# Patient Record
Sex: FEMALE | Race: WHITE | NOT HISPANIC OR LATINO | Employment: UNEMPLOYED | URBAN - METROPOLITAN AREA
[De-identification: names, ages, dates, MRNs, and addresses within clinical notes are randomized per-mention and may not be internally consistent; named-entity substitution may affect disease eponyms.]

---

## 2023-05-10 ENCOUNTER — HOSPITAL ENCOUNTER (INPATIENT)
Facility: HOSPITAL | Age: 28
LOS: 2 days | Discharge: HOME | DRG: 776 | End: 2023-05-12
Attending: OBSTETRICS & GYNECOLOGY | Admitting: OBSTETRICS & GYNECOLOGY
Payer: MEDICAID

## 2023-05-10 DIAGNOSIS — Z34.90 PREGNANCY: ICD-10-CM

## 2023-05-10 PROBLEM — O09.33 NO PRENATAL CARE IN CURRENT PREGNANCY IN THIRD TRIMESTER: Status: ACTIVE | Noted: 2023-05-10

## 2023-05-10 LAB
ABO + RH BLD: NORMAL
ALBUMIN SERPL BCP-MCNC: 3.1 G/DL (ref 3.5–5.2)
ALP SERPL-CCNC: 186 U/L (ref 55–135)
ALT SERPL W/O P-5'-P-CCNC: 28 U/L (ref 10–44)
ANION GAP SERPL CALC-SCNC: 12 MMOL/L (ref 8–16)
AST SERPL-CCNC: 30 U/L (ref 10–40)
BASOPHILS # BLD AUTO: 0.05 K/UL (ref 0–0.2)
BASOPHILS NFR BLD: 0.2 % (ref 0–1.9)
BILIRUB SERPL-MCNC: 0.5 MG/DL (ref 0.1–1)
BLD GP AB SCN CELLS X3 SERPL QL: NORMAL
BUN SERPL-MCNC: 7 MG/DL (ref 6–20)
CALCIUM SERPL-MCNC: 8.9 MG/DL (ref 8.7–10.5)
CHLORIDE SERPL-SCNC: 106 MMOL/L (ref 95–110)
CO2 SERPL-SCNC: 18 MMOL/L (ref 23–29)
CREAT SERPL-MCNC: 0.7 MG/DL (ref 0.5–1.4)
CREAT UR-MCNC: 184.3 MG/DL (ref 15–325)
DIFFERENTIAL METHOD: ABNORMAL
EOSINOPHIL # BLD AUTO: 0 K/UL (ref 0–0.5)
EOSINOPHIL NFR BLD: 0 % (ref 0–8)
ERYTHROCYTE [DISTWIDTH] IN BLOOD BY AUTOMATED COUNT: 15.2 % (ref 11.5–14.5)
EST. GFR  (NO RACE VARIABLE): >60 ML/MIN/1.73 M^2
GLUCOSE SERPL-MCNC: 103 MG/DL (ref 70–110)
HCT VFR BLD AUTO: 36.3 % (ref 37–48.5)
HGB BLD-MCNC: 11.6 G/DL (ref 12–16)
HIV1+2 IGG SERPL QL IA.RAPID: NORMAL
IMM GRANULOCYTES # BLD AUTO: 0.14 K/UL (ref 0–0.04)
IMM GRANULOCYTES NFR BLD AUTO: 0.5 % (ref 0–0.5)
LYMPHOCYTES # BLD AUTO: 0.9 K/UL (ref 1–4.8)
LYMPHOCYTES NFR BLD: 3.5 % (ref 18–48)
MCH RBC QN AUTO: 24.6 PG (ref 27–31)
MCHC RBC AUTO-ENTMCNC: 32 G/DL (ref 32–36)
MCV RBC AUTO: 77 FL (ref 82–98)
MONOCYTES # BLD AUTO: 1 K/UL (ref 0.3–1)
MONOCYTES NFR BLD: 4 % (ref 4–15)
NEUTROPHILS # BLD AUTO: 23.7 K/UL (ref 1.8–7.7)
NEUTROPHILS NFR BLD: 91.8 % (ref 38–73)
NRBC BLD-RTO: 0 /100 WBC
PLATELET # BLD AUTO: 404 K/UL (ref 150–450)
PMV BLD AUTO: 11.5 FL (ref 9.2–12.9)
POTASSIUM SERPL-SCNC: 4.8 MMOL/L (ref 3.5–5.1)
PROT SERPL-MCNC: 7 G/DL (ref 6–8.4)
PROT UR-MCNC: 29 MG/DL
PROT/CREAT UR: 0.16 MG/G{CREAT} (ref 0–0.2)
RBC # BLD AUTO: 4.71 M/UL (ref 4–5.4)
SODIUM SERPL-SCNC: 136 MMOL/L (ref 136–145)
WBC # BLD AUTO: 25.87 K/UL (ref 3.9–12.7)

## 2023-05-10 PROCEDURE — 86592 SYPHILIS TEST NON-TREP QUAL: CPT | Performed by: OBSTETRICS & GYNECOLOGY

## 2023-05-10 PROCEDURE — 86703 HIV-1/HIV-2 1 RESULT ANTBDY: CPT | Performed by: OBSTETRICS & GYNECOLOGY

## 2023-05-10 PROCEDURE — 25000003 PHARM REV CODE 250: Performed by: OBSTETRICS & GYNECOLOGY

## 2023-05-10 PROCEDURE — 88307 PR  SURG PATH,LEVEL V: ICD-10-PCS | Mod: 26,,, | Performed by: PATHOLOGY

## 2023-05-10 PROCEDURE — 87389 HIV-1 AG W/HIV-1&-2 AB AG IA: CPT | Performed by: OBSTETRICS & GYNECOLOGY

## 2023-05-10 PROCEDURE — 36415 COLL VENOUS BLD VENIPUNCTURE: CPT | Performed by: OBSTETRICS & GYNECOLOGY

## 2023-05-10 PROCEDURE — 63600175 PHARM REV CODE 636 W HCPCS: Performed by: OBSTETRICS & GYNECOLOGY

## 2023-05-10 PROCEDURE — 88307 TISSUE EXAM BY PATHOLOGIST: CPT | Performed by: PATHOLOGY

## 2023-05-10 PROCEDURE — 86900 BLOOD TYPING SEROLOGIC ABO: CPT | Performed by: OBSTETRICS & GYNECOLOGY

## 2023-05-10 PROCEDURE — 85025 COMPLETE CBC W/AUTO DIFF WBC: CPT | Performed by: OBSTETRICS & GYNECOLOGY

## 2023-05-10 PROCEDURE — 11000001 HC ACUTE MED/SURG PRIVATE ROOM

## 2023-05-10 PROCEDURE — 84156 ASSAY OF PROTEIN URINE: CPT | Performed by: OBSTETRICS & GYNECOLOGY

## 2023-05-10 PROCEDURE — 80074 ACUTE HEPATITIS PANEL: CPT | Performed by: OBSTETRICS & GYNECOLOGY

## 2023-05-10 PROCEDURE — 86762 RUBELLA ANTIBODY: CPT | Performed by: OBSTETRICS & GYNECOLOGY

## 2023-05-10 PROCEDURE — 80053 COMPREHEN METABOLIC PANEL: CPT | Performed by: OBSTETRICS & GYNECOLOGY

## 2023-05-10 PROCEDURE — 88307 TISSUE EXAM BY PATHOLOGIST: CPT | Mod: 26,,, | Performed by: PATHOLOGY

## 2023-05-10 RX ORDER — MISOPROSTOL 200 UG/1
800 TABLET ORAL ONCE AS NEEDED
Status: DISCONTINUED | OUTPATIENT
Start: 2023-05-10 | End: 2023-05-12 | Stop reason: HOSPADM

## 2023-05-10 RX ORDER — DOCUSATE SODIUM 100 MG/1
200 CAPSULE, LIQUID FILLED ORAL 2 TIMES DAILY PRN
Status: DISCONTINUED | OUTPATIENT
Start: 2023-05-10 | End: 2023-05-12 | Stop reason: HOSPADM

## 2023-05-10 RX ORDER — HYDRALAZINE HYDROCHLORIDE 25 MG/1
50 TABLET, FILM COATED ORAL EVERY 8 HOURS
Status: COMPLETED | OUTPATIENT
Start: 2023-05-10 | End: 2023-05-10

## 2023-05-10 RX ORDER — HYDRALAZINE HYDROCHLORIDE 20 MG/ML
10 INJECTION INTRAMUSCULAR; INTRAVENOUS ONCE AS NEEDED
Status: DISCONTINUED | OUTPATIENT
Start: 2023-05-10 | End: 2023-05-12 | Stop reason: HOSPADM

## 2023-05-10 RX ORDER — DIPHENHYDRAMINE HCL 25 MG
25 CAPSULE ORAL EVERY 4 HOURS PRN
Status: DISCONTINUED | OUTPATIENT
Start: 2023-05-10 | End: 2023-05-12 | Stop reason: HOSPADM

## 2023-05-10 RX ORDER — CARBOPROST TROMETHAMINE 250 UG/ML
250 INJECTION, SOLUTION INTRAMUSCULAR
Status: DISCONTINUED | OUTPATIENT
Start: 2023-05-10 | End: 2023-05-12 | Stop reason: HOSPADM

## 2023-05-10 RX ORDER — OXYCODONE AND ACETAMINOPHEN 5; 325 MG/1; MG/1
1 TABLET ORAL EVERY 4 HOURS PRN
Status: DISCONTINUED | OUTPATIENT
Start: 2023-05-10 | End: 2023-05-12 | Stop reason: HOSPADM

## 2023-05-10 RX ORDER — HYDROCORTISONE 25 MG/G
CREAM TOPICAL 3 TIMES DAILY PRN
Status: DISCONTINUED | OUTPATIENT
Start: 2023-05-10 | End: 2023-05-12 | Stop reason: HOSPADM

## 2023-05-10 RX ORDER — MISOPROSTOL 200 UG/1
TABLET ORAL
Status: DISPENSED
Start: 2023-05-10 | End: 2023-05-11

## 2023-05-10 RX ORDER — OXYTOCIN/RINGER'S LACTATE 30/500 ML
95 PLASTIC BAG, INJECTION (ML) INTRAVENOUS ONCE AS NEEDED
Status: DISCONTINUED | OUTPATIENT
Start: 2023-05-10 | End: 2023-05-12 | Stop reason: HOSPADM

## 2023-05-10 RX ORDER — OXYTOCIN 10 [USP'U]/ML
10 INJECTION, SOLUTION INTRAMUSCULAR; INTRAVENOUS ONCE AS NEEDED
Status: DISCONTINUED | OUTPATIENT
Start: 2023-05-10 | End: 2023-05-12 | Stop reason: HOSPADM

## 2023-05-10 RX ORDER — LABETALOL HCL 20 MG/4 ML
40 SYRINGE (ML) INTRAVENOUS ONCE AS NEEDED
Status: DISCONTINUED | OUTPATIENT
Start: 2023-05-10 | End: 2023-05-12 | Stop reason: HOSPADM

## 2023-05-10 RX ORDER — PROCHLORPERAZINE EDISYLATE 5 MG/ML
5 INJECTION INTRAMUSCULAR; INTRAVENOUS EVERY 6 HOURS PRN
Status: DISCONTINUED | OUTPATIENT
Start: 2023-05-10 | End: 2023-05-12 | Stop reason: HOSPADM

## 2023-05-10 RX ORDER — MISOPROSTOL 200 UG/1
800 TABLET ORAL ONCE
Status: DISCONTINUED | OUTPATIENT
Start: 2023-05-10 | End: 2023-05-10

## 2023-05-10 RX ORDER — OXYCODONE AND ACETAMINOPHEN 10; 325 MG/1; MG/1
1 TABLET ORAL EVERY 4 HOURS PRN
Status: DISCONTINUED | OUTPATIENT
Start: 2023-05-10 | End: 2023-05-12 | Stop reason: HOSPADM

## 2023-05-10 RX ORDER — SODIUM CHLORIDE, SODIUM LACTATE, POTASSIUM CHLORIDE, CALCIUM CHLORIDE 600; 310; 30; 20 MG/100ML; MG/100ML; MG/100ML; MG/100ML
INJECTION, SOLUTION INTRAVENOUS CONTINUOUS
Status: DISCONTINUED | OUTPATIENT
Start: 2023-05-10 | End: 2023-05-10

## 2023-05-10 RX ORDER — LABETALOL 100 MG/1
100 TABLET, FILM COATED ORAL 2 TIMES DAILY
Status: DISCONTINUED | OUTPATIENT
Start: 2023-05-10 | End: 2023-05-12 | Stop reason: HOSPADM

## 2023-05-10 RX ORDER — METHYLERGONOVINE MALEATE 0.2 MG/ML
200 INJECTION INTRAVENOUS
Status: DISCONTINUED | OUTPATIENT
Start: 2023-05-10 | End: 2023-05-12 | Stop reason: HOSPADM

## 2023-05-10 RX ORDER — SIMETHICONE 80 MG
1 TABLET,CHEWABLE ORAL EVERY 6 HOURS PRN
Status: DISCONTINUED | OUTPATIENT
Start: 2023-05-10 | End: 2023-05-12 | Stop reason: HOSPADM

## 2023-05-10 RX ORDER — OXYTOCIN/RINGER'S LACTATE 30/500 ML
95 PLASTIC BAG, INJECTION (ML) INTRAVENOUS ONCE
Status: DISCONTINUED | OUTPATIENT
Start: 2023-05-10 | End: 2023-05-12 | Stop reason: HOSPADM

## 2023-05-10 RX ORDER — SODIUM CHLORIDE 0.9 % (FLUSH) 0.9 %
10 SYRINGE (ML) INJECTION
Status: DISCONTINUED | OUTPATIENT
Start: 2023-05-10 | End: 2023-05-12 | Stop reason: HOSPADM

## 2023-05-10 RX ORDER — TRANEXAMIC ACID 10 MG/ML
1000 INJECTION, SOLUTION INTRAVENOUS ONCE AS NEEDED
Status: DISCONTINUED | OUTPATIENT
Start: 2023-05-10 | End: 2023-05-12 | Stop reason: HOSPADM

## 2023-05-10 RX ORDER — DIPHENHYDRAMINE HYDROCHLORIDE 50 MG/ML
25 INJECTION INTRAMUSCULAR; INTRAVENOUS EVERY 4 HOURS PRN
Status: DISCONTINUED | OUTPATIENT
Start: 2023-05-10 | End: 2023-05-12 | Stop reason: HOSPADM

## 2023-05-10 RX ORDER — IBUPROFEN 600 MG/1
600 TABLET ORAL EVERY 6 HOURS PRN
Status: DISCONTINUED | OUTPATIENT
Start: 2023-05-10 | End: 2023-05-12 | Stop reason: HOSPADM

## 2023-05-10 RX ORDER — ONDANSETRON 8 MG/1
8 TABLET, ORALLY DISINTEGRATING ORAL EVERY 8 HOURS PRN
Status: DISCONTINUED | OUTPATIENT
Start: 2023-05-10 | End: 2023-05-12 | Stop reason: HOSPADM

## 2023-05-10 RX ORDER — LABETALOL HCL 20 MG/4 ML
80 SYRINGE (ML) INTRAVENOUS ONCE AS NEEDED
Status: DISCONTINUED | OUTPATIENT
Start: 2023-05-10 | End: 2023-05-12 | Stop reason: HOSPADM

## 2023-05-10 RX ORDER — LIDOCAINE HYDROCHLORIDE 10 MG/ML
INJECTION INFILTRATION; PERINEURAL
Status: DISCONTINUED
Start: 2023-05-10 | End: 2023-05-10 | Stop reason: WASHOUT

## 2023-05-10 RX ORDER — OXYTOCIN/RINGER'S LACTATE 30/500 ML
334 PLASTIC BAG, INJECTION (ML) INTRAVENOUS ONCE AS NEEDED
Status: DISCONTINUED | OUTPATIENT
Start: 2023-05-10 | End: 2023-05-12 | Stop reason: HOSPADM

## 2023-05-10 RX ORDER — LABETALOL HCL 20 MG/4 ML
20 SYRINGE (ML) INTRAVENOUS ONCE AS NEEDED
Status: DISCONTINUED | OUTPATIENT
Start: 2023-05-10 | End: 2023-05-12 | Stop reason: HOSPADM

## 2023-05-10 RX ADMIN — SODIUM CHLORIDE, POTASSIUM CHLORIDE, SODIUM LACTATE AND CALCIUM CHLORIDE: 600; 310; 30; 20 INJECTION, SOLUTION INTRAVENOUS at 04:05

## 2023-05-10 RX ADMIN — LABETALOL HYDROCHLORIDE 100 MG: 100 TABLET, FILM COATED ORAL at 07:05

## 2023-05-10 RX ADMIN — HYDRALAZINE HYDROCHLORIDE 50 MG: 25 TABLET, FILM COATED ORAL at 04:05

## 2023-05-10 RX ADMIN — IBUPROFEN 600 MG: 600 TABLET ORAL at 07:05

## 2023-05-10 NOTE — NURSING
Dr. Jordan informed of pts bp. Unable to start iv due to difficulty stick.   Hydralazine po ordered

## 2023-05-10 NOTE — NURSING
Received to unit per ems stretcher for  at home.  Baby in arms. Placenta delivered.  NICU team at bedside. Dr. Gant at bedside. Assessed per Dr. Gant. No vaginal laceration noted.  FF 1 below.  Small bleeding.  Unable to obtain iv access at this time.  Cytotec 800 adm per Dr. Gant's order.

## 2023-05-10 NOTE — H&P
[facility]    Obstetrics & Gynecology  History & Physical      Patient Name:  Deborah Akbar  MRN:  [id number]  Admission Date:  5/10/2023  Hospital Length of Stay:  0  Attending Physician:  Dontae Gant MD    Date:  05/10/2023     Chief Complaint:  Delivery at home    History of Present Illness:      Deborah Akbar is a 28 y.o.  admitted to L&D for post-partum care.  Pt is s/p home delivery of term appearing male baby with assistance of EMS.  Upon arrival to hospital, pt was in stable condition.  I examined pt at bedside, no vaginal laceration noted, placenta appears grossly intact, fundus was firm with light lochia.  Pt states she lives in [place] and is here visiting from out of town.  Pt states she was unaware she was pregnant with no prenatal care.  Pt is new to me while on call.    Past Medical History:      No past medical history on file.      Past Surgical History:     Tonsils and adenoids removal       Medications:     None    Allergies:      NKDA      Obstetric History:      G1, current    Gynecologic History:      Denies active STI  Denies abnormal Pap     Social History:      Denies tobacco, alcohol or illicit drug use  Denies domestic abuse     Family History:       Noncontributory    Review of Systems   Constitutional:  Positive for malaise/fatigue.   HENT: Negative.     Eyes: Negative.    Respiratory: Negative.     Cardiovascular: Negative.    Gastrointestinal:  Positive for abdominal pain.   Genitourinary: Negative.    Musculoskeletal:  Positive for back pain.   Skin: Negative.    Neurological: Negative.    Endo/Heme/Allergies: Negative.    Psychiatric/Behavioral: Negative.       Vitals:     Temp:  [98.1 °F (36.7 °C)] 98.1 °F (36.7 °C)  Pulse:  [] 94  Resp:  [20] 20  BP: (124-169)/() 133/63    GENERAL:  No acute distress.  Alert and oriented to person, place and time.  HEENT:  Normocephalic, atraumatic, anicteric, EOMI, moist mucus membranes.  Neck supple without  masses.  LUNGS:  Clear to auscultation bilaterally.  HEART:  Regular rate & rhythm with physiologic heart sounds.  ABDOMEN:  Soft, non tender without any guarding, rigidity or rebound.   PELVIC:  Normal female external genitalia without gross lesions, rashes or excoriations. No gross vaginal or cervical lesions.  Light lochia.  Fundus firm. No obvious vaginal/perineal laceration.    EXTREMITIES:  Symmetric without cramping, claudication. Trace edema LE. +2 distal pulses.  Full range of motion.  SKIN:  No rashes, good turgor & capillary refill.  NEUROLOGIC:  Grossly intact bilaterally.   PSYCH:  Mood & affect appropriate.       Laboratory:     Recent Labs   Lab 05/10/23  1458   WBC 25.87*   RBC 4.71   HGB 11.6*   HCT 36.3*      MCV 77*   MCH 24.6*   MCHC 32.0     ABO:  O POS    Assessment:     28 y.o.  with term appearing fetus s/p home vaginal delivery  No prenatal care    Plan:    Admit to L&D for post-partum care  Send off for placental gross examination  Consult social service      Dontae Gant MD

## 2023-05-11 LAB
BASOPHILS # BLD AUTO: 0.06 K/UL (ref 0–0.2)
BASOPHILS NFR BLD: 0.4 % (ref 0–1.9)
DIFFERENTIAL METHOD: ABNORMAL
EOSINOPHIL # BLD AUTO: 0.2 K/UL (ref 0–0.5)
EOSINOPHIL NFR BLD: 1 % (ref 0–8)
ERYTHROCYTE [DISTWIDTH] IN BLOOD BY AUTOMATED COUNT: 15.6 % (ref 11.5–14.5)
HAV IGM SERPL QL IA: ABNORMAL
HBV CORE IGM SERPL QL IA: ABNORMAL
HBV SURFACE AG SERPL QL IA: ABNORMAL
HCT VFR BLD AUTO: 29.5 % (ref 37–48.5)
HCV AB SERPL QL IA: REACTIVE
HGB BLD-MCNC: 9.4 G/DL (ref 12–16)
HIV 1+2 AB+HIV1 P24 AG SERPL QL IA: NORMAL
IMM GRANULOCYTES # BLD AUTO: 0.11 K/UL (ref 0–0.04)
IMM GRANULOCYTES NFR BLD AUTO: 0.7 % (ref 0–0.5)
LYMPHOCYTES # BLD AUTO: 3.1 K/UL (ref 1–4.8)
LYMPHOCYTES NFR BLD: 19.1 % (ref 18–48)
MCH RBC QN AUTO: 24.5 PG (ref 27–31)
MCHC RBC AUTO-ENTMCNC: 31.9 G/DL (ref 32–36)
MCV RBC AUTO: 77 FL (ref 82–98)
MONOCYTES # BLD AUTO: 1.3 K/UL (ref 0.3–1)
MONOCYTES NFR BLD: 8.3 % (ref 4–15)
NEUTROPHILS # BLD AUTO: 11.4 K/UL (ref 1.8–7.7)
NEUTROPHILS NFR BLD: 70.5 % (ref 38–73)
NRBC BLD-RTO: 0 /100 WBC
PLATELET # BLD AUTO: 313 K/UL (ref 150–450)
PMV BLD AUTO: 11.6 FL (ref 9.2–12.9)
RBC # BLD AUTO: 3.84 M/UL (ref 4–5.4)
RUBV IGG SER-ACNC: 34.5 IU/ML
RUBV IGG SER-IMP: REACTIVE
WBC # BLD AUTO: 16.15 K/UL (ref 3.9–12.7)

## 2023-05-11 PROCEDURE — 99231 SBSQ HOSP IP/OBS SF/LOW 25: CPT | Mod: ,,, | Performed by: OBSTETRICS & GYNECOLOGY

## 2023-05-11 PROCEDURE — 11000001 HC ACUTE MED/SURG PRIVATE ROOM

## 2023-05-11 PROCEDURE — 36415 COLL VENOUS BLD VENIPUNCTURE: CPT | Performed by: OBSTETRICS & GYNECOLOGY

## 2023-05-11 PROCEDURE — 85025 COMPLETE CBC W/AUTO DIFF WBC: CPT | Performed by: OBSTETRICS & GYNECOLOGY

## 2023-05-11 PROCEDURE — 25000003 PHARM REV CODE 250: Performed by: OBSTETRICS & GYNECOLOGY

## 2023-05-11 PROCEDURE — 99231 PR SUBSEQUENT HOSPITAL CARE,LEVL I: ICD-10-PCS | Mod: ,,, | Performed by: OBSTETRICS & GYNECOLOGY

## 2023-05-11 RX ADMIN — OXYCODONE AND ACETAMINOPHEN 1 TABLET: 5; 325 TABLET ORAL at 01:05

## 2023-05-11 RX ADMIN — LABETALOL HYDROCHLORIDE 100 MG: 100 TABLET, FILM COATED ORAL at 09:05

## 2023-05-11 RX ADMIN — OXYCODONE HYDROCHLORIDE AND ACETAMINOPHEN 1 TABLET: 10; 325 TABLET ORAL at 07:05

## 2023-05-11 RX ADMIN — SIMETHICONE 80 MG: 80 TABLET, CHEWABLE ORAL at 09:05

## 2023-05-11 RX ADMIN — DIPHENHYDRAMINE HYDROCHLORIDE 25 MG: 25 CAPSULE ORAL at 11:05

## 2023-05-11 NOTE — LACTATION NOTE
Discussed use of electric breastpump and importance of EBM for infant in NICU. Mother declines use of pump and does not desire to breastfeed.

## 2023-05-11 NOTE — NURSING
Pt ambulated to bathroom w/ RN assistance. Pt voided 300cc, pericare provided, pad and underwear provided, clean gown change.

## 2023-05-11 NOTE — NURSING
Dr. Gant called. POC reviewed w/ MD. MD order for PC ratio, labetalol 100mg BID starting now, keep pt for 2hr to monitor BP, labetalol HTN protocol for severe range pressures. Orders received.

## 2023-05-11 NOTE — PROGRESS NOTES
"[facility]    Obstetrics & Gynecology  Progress Note      Patient Name:  Deborah Akbar  MRN:  [id number]  Admission Date:  5/10/2023  Hospital Length of Stay:  1  Attending Physician:  Dontae Gant MD    Subjective:     Date:  2023    Principal Problem:   (spontaneous vaginal delivery)    Hospital Course:  Post Partum Day # 1 - s/p vaginal delivery at 40w0d    Patient without major complaints  No acute events overnight  Denies dizziness, SOB, DENIS, or CP  Pain well controlled  Lochia less than menses  Bottle/breast feeding   Breast non-tender, non-engorged  Ambulating, tolerating regular diet, and voiding without diffculty  Bonding well with baby    Objective:     Temp:  [98.1 °F (36.7 °C)-99.3 °F (37.4 °C)] 98.2 °F (36.8 °C)  Pulse:  [] 76  Resp:  [17-20] 17  SpO2:  [97 %] 97 %  BP: (111-169)/() 137/92    BP (!) 137/92 (BP Location: Right arm, Patient Position: Lying)   Pulse 76   Temp 98.2 °F (36.8 °C) (Oral)   Resp 17   Ht 5' 4" (1.626 m)   Wt 65.8 kg (145 lb)   LMP  (LMP Unknown)   SpO2 97%   Breastfeeding No   BMI 24.89 kg/m²     Intake/Output Summary (Last 24 hours) at 2023 1121  Last data filed at 5/10/2023 2100  Gross per 24 hour   Intake --   Output 300 ml   Net -300 ml   Clear, sunni urine    GENERAL:  No acute distress. Alert and oriented x 3.   HEENT:  No scleral icterus. Neck supple. Moist mucus membranes.   LUNGS:  Clear to auscultation bilaterally.   HEART:  Regular rate and rhythm with physiologic heart sounds.   ABDOMEN:  Soft, non-tender, non-distended, no guarding, rigidity, or rebound with normoactive bowel sounds.   FUNDUS:  Firm, nontender below the umbilicus.   EXTREMITIES:  No cramping, claudication.  Trace edema LE. +2 distal pulses. Symmetric, full range of motion, negative Delarosa.  NEUROLOGIC:  Grossly intact bilaterally. +2 DTR's.   PSYCH:  Mood and affect appropriate.   PERINEUM:  Intact with light lochia.    Labs:      Recent " Results (from the past 336 hour(s))   CBC auto differential    Collection Time: 05/11/23  6:19 AM   Result Value Ref Range    WBC 16.15 (H) 3.90 - 12.70 K/uL    Hemoglobin 9.4 (L) 12.0 - 16.0 g/dL    Hematocrit 29.5 (L) 37.0 - 48.5 %    Platelets 313 150 - 450 K/uL   CBC auto differential    Collection Time: 05/10/23  2:58 PM   Result Value Ref Range    WBC 25.87 (H) 3.90 - 12.70 K/uL    Hemoglobin 11.6 (L) 12.0 - 16.0 g/dL    Hematocrit 36.3 (L) 37.0 - 48.5 %    Platelets 404 150 - 450 K/uL     ABO:  O POS    Assessment:     Post-partum day # 1 - IUP at 40w0d s/p spontaneous vaginal delivery - progressing well  Hepatitis C antibody positive  Gestational hypertension  Anemia, acute blood loss expected post-partum    Plan:     Continue post-partum care  Review post-partum care instructions and precautions  Encourage ambulation    We discussed the risk of vertical transmission with Hepatitis C. Although breast feeding is not contraindicated in Hepatitis C, there is a risk of 2 - 3% risk of vertical transmission.  Pt has been informed that hepatitis C is a serious disease that can result in long-term health problems, including liver damage, liver failure, liver cancer, or even death.  I recommended that her partner(s) be tested for Hepatitis C and seek treatment if indicated.  Order Hepatitis C Viral load.  Pedi notified of maternal Hep C status.  Pt was encouraged to f/u with her PCP for long term follow-up after delivery for further treatment if indicated.  Safe sex practices discussed.    Discussed natural course of gestational hypertension.  BP at goal on labetalol 100 mg bid.  Titrate accordingly.  HTN precautions.      Iron supplementation when ready    Plan of care d/w pt, all questions answered and pt voiced understanding.     Disposition:  As patient meets milestones, will plan to discharge.      Dontae Gant MD

## 2023-05-11 NOTE — PLAN OF CARE
VSS. NAD. Ambulating and voiding. Pain management plan reviewed. Declines breastfeeding. + Bonding noted upon visit to NICU. Discussed POC, lactation, and labs. Pt verbalizes understanding.

## 2023-05-11 NOTE — NURSING
Dr. Gant called. MD notified of BPs since admin of PO labetalol, unable to obtain clean catch urine for PC ratio due to lochia. MD order to encourage PO hydration and obtain urine sample via straight cath in 1hr. Orders received.

## 2023-05-12 VITALS
WEIGHT: 145 LBS | OXYGEN SATURATION: 98 % | SYSTOLIC BLOOD PRESSURE: 147 MMHG | DIASTOLIC BLOOD PRESSURE: 87 MMHG | BODY MASS INDEX: 24.75 KG/M2 | TEMPERATURE: 98 F | HEIGHT: 64 IN | HEART RATE: 82 BPM | RESPIRATION RATE: 18 BRPM

## 2023-05-12 PROBLEM — O13.9 GESTATIONAL HYPERTENSION, ANTEPARTUM: Status: ACTIVE | Noted: 2023-05-12

## 2023-05-12 LAB
HCV RNA SERPL NAA+PROBE-LOG IU: 5.59 LOGIU/ML
HCV RNA SERPL QL NAA+PROBE: DETECTED
HCV RNA SPEC NAA+PROBE-ACNC: ABNORMAL IU/ML
RPR SER QL: NORMAL

## 2023-05-12 PROCEDURE — 25000003 PHARM REV CODE 250: Performed by: OBSTETRICS & GYNECOLOGY

## 2023-05-12 PROCEDURE — 87522 HEPATITIS C REVRS TRNSCRPJ: CPT | Performed by: OBSTETRICS & GYNECOLOGY

## 2023-05-12 PROCEDURE — 99238 HOSP IP/OBS DSCHRG MGMT 30/<: CPT | Mod: ,,, | Performed by: OBSTETRICS & GYNECOLOGY

## 2023-05-12 PROCEDURE — 36415 COLL VENOUS BLD VENIPUNCTURE: CPT | Performed by: OBSTETRICS & GYNECOLOGY

## 2023-05-12 PROCEDURE — 99238 PR HOSPITAL DISCHARGE DAY,<30 MIN: ICD-10-PCS | Mod: ,,, | Performed by: OBSTETRICS & GYNECOLOGY

## 2023-05-12 RX ORDER — FERROUS SULFATE 325(65) MG
325 TABLET ORAL 2 TIMES DAILY
Qty: 60 TABLET | Refills: 1 | Status: SHIPPED | OUTPATIENT
Start: 2023-05-12

## 2023-05-12 RX ORDER — LABETALOL 100 MG/1
100 TABLET, FILM COATED ORAL 2 TIMES DAILY
Qty: 60 TABLET | Refills: 1 | Status: SHIPPED | OUTPATIENT
Start: 2023-05-12

## 2023-05-12 RX ORDER — IBUPROFEN 600 MG/1
600 TABLET ORAL EVERY 6 HOURS PRN
Qty: 40 TABLET | Refills: 0 | Status: SHIPPED | OUTPATIENT
Start: 2023-05-12

## 2023-05-12 RX ADMIN — LABETALOL HYDROCHLORIDE 100 MG: 100 TABLET, FILM COATED ORAL at 09:05

## 2023-05-12 RX ADMIN — OXYCODONE HYDROCHLORIDE AND ACETAMINOPHEN 1 TABLET: 10; 325 TABLET ORAL at 09:05

## 2023-05-12 NOTE — PLAN OF CARE
[facility]  OB Initial Discharge Assessment       Primary Care Provider: Primary Doctor No    Expected Discharge Date: 2023    Preferred Pharmacy:  [facility] [place]      Initial Assessment (most recent)       OB Discharge Planning Assessment - 23 1533          OB Discharge Planning Assessment    Assessment Type Discharge Planning Assessment     Source of Information patient     Verified Demographic and Insurance Information Yes     Insurance Medicaid   Patient currently has out of state medicaid/[place]    Pastoral Care/Clergy/ Contact Status none needed     People in Home significant other;child(henrik), dependent     Name(s) of People in Home Fredy Valle  (significant other)  [phone]     Number people in home 3     Relationship Status --   Single    Name of Support/Comfort Primary Source Significant other:  Fredy Valle [phone]     Other children (include names and ages) Maykel Akbar  (2023)     Employed No     Currently Enrolled in School No     Highest Level of Education Other   11th grade    Father's Involvement None   Ariel Casiano    Is Father signing the birth certificate No     Father's Address unknown     Received Prenatal Care No   Did not know that she was pregnant    Transportation Anticipated family or friend will provide     Receive WIC Benefits Not certified, will apply for       Arrangements Self     Adoption Planned no     Infant Feeding Plan breastfeeding;formula feeding     Breast Pump Needed yes     Does baby have crib or safe sleep space? No     Plans to obtain crib by discharge Plans to obtain by discharge     Do you have a car seat? No     Provided resources to obtain --   Will have prior to discharge.  Significant other to obtain basic necessities.    Has other essential care items? None     Pediatrician TBD     Other Providers/Services outpatient care     Equipment Currently Used at Home none      Resources/Education Provided WIC     DME Needed Upon Discharge  none     DCFS --   Report will be submitted if baby tests positive for any reportable substances.    Discharge Plan A Home     Discharge Plan B Home                          SW left a message for [facility] Medicaid Representative @ [phone] advising of patient's need to apply for [place] Medicaid.  JM left patient's MRN and contact number.  SW providing patient with information to apply for WIC.    Patient's temporary address:  [address] [place] [address], [place], [place]

## 2023-05-12 NOTE — PROGRESS NOTES
"[facility]    Obstetrics & Gynecology  Progress Note      Patient Name:  Deborah Akbar  MRN:  [id number]  Admission Date:  5/10/2023  Hospital Length of Stay:  2  Attending Physician:  Dontae Gant MD    Subjective:     Date:  2023    Principal Problem:   (spontaneous vaginal delivery)    Hospital Course:  Post Partum Day # 2 - s/p vaginal delivery at 40w0d    Patient without major complaints  No acute events overnight  Denies headaches, vision changes, epigastric pain, or acute swelling  Denies dizziness, SOB, DENIS, or CP  Pain well controlled  Lochia less than menses  Bottle/breast feeding   Breast non-tender, non-engorged  Ambulating, tolerating regular diet, and voiding without diffculty  Bonding well with baby    Objective:     Temp:  [97.9 °F (36.6 °C)-98.5 °F (36.9 °C)] 97.9 °F (36.6 °C)  Pulse:  [79-86] 82  Resp:  [16-20] 18  SpO2:  [97 %-100 %] 98 %  BP: (113-149)/(73-89) 147/87    BP (!) 147/87 (BP Location: Left arm, Patient Position: Lying)   Pulse 82   Temp 97.9 °F (36.6 °C) (Oral)   Resp 18   Ht 5' 4" (1.626 m)   Wt 65.8 kg (145 lb)   LMP  (LMP Unknown)   SpO2 98%   Breastfeeding No   BMI 24.89 kg/m²   Clear, sunni urine    GENERAL:  No acute distress. Alert and oriented x 3.   HEENT:  No scleral icterus. Neck supple. Moist mucus membranes.   LUNGS:  Clear to auscultation bilaterally.   HEART:  Regular rate and rhythm with physiologic heart sounds.   ABDOMEN:  Soft, non-tender, non-distended, no guarding, rigidity, or rebound with normoactive bowel sounds.   FUNDUS:  Firm, nontender below the umbilicus.   EXTREMITIES:  No cramping, claudication.  Trace edema LE. +2 distal pulses. Symmetric, full range of motion, negative Delarosa.  NEUROLOGIC:  Grossly intact bilaterally. +2 DTR's.   PSYCH:  Mood and affect appropriate.   PERINEUM:  Intact with light lochia.    ABO:  O POS    Assessment:     Post-partum day # 2 - IUP at 40w0d s/p spontaneous vaginal delivery - " progressing well  Hepatitis C antibody positive  Gestational hypertension  Anemia, acute blood loss expected post-partum    Plan:     Plan for discharge today.     Pt was encourage to follow up with PCP for long term follow up of Hepatitis C.  We discussed that hepatitis C is a serious disease that can result in long-term health problems, including liver damage, liver failure, liver cancer, or even death.  I recommended that her partner(s) be tested for Hepatitis C and seek treatment if indicated.  Safe sex practices discussed.    Continue labetalol 100 mg bid for gestational hypertension. Check bp tid at home and call office if bp > 150/100 or bp <100/70, P < 50. Hypertensive/preE precautions reivewed.    Iron supplementation, anemia precautions    Post-partum care instructions and precautions and hospital course reviewed with pt prior to discharge.  All of her questions were answered to her satisfaction.  Pt voiced understanding and agrees with discharge.    Return to clinic in 1 week for post-partum visit/BP check or sooner if any concerns.  Go to ER for any emergencies.      Dontae Gant MD

## 2023-05-12 NOTE — DISCHARGE SUMMARY
[facility]    Discharge Summary  Obstetrics & Gynecology      Patient Name:  Deborah Akbar  MRN:  [id number]  Admission Date:  5/10/2023  Discharge Date:  2023  Hospital Length of Stay:  2  Attending Physician:  Dontae Gatn MD  Discharging Physician:  Dontae Gant MD    Admitting Diagnosis:       Term naqvi gestation   S/p spontaneous vaginal delivery at home  No prenatal care  Gestational hypertension     Discharge Diagnosis:    Term naqvi gestation   S/p spontaneous vaginal delivery at home  Hep C antibody positive  Gestational hypertension    Brief Hospital Course:      Deborah Akbar is a 28 y.o.  admitted to L&D for post-partum care.  Pt is s/p home delivery of term appearing male baby with assistance of EMS.  Upon arrival to hospital, pt was in stable condition.  I examined pt at bedside, no vaginal laceration noted, placenta appears grossly intact, fundus was firm with light lochia.  Pt states she lives in [place] and is here visiting from out of town.  Pt states she was unaware she was pregnant with no prenatal care.  Pt is new to me while on call.   Pt had a fairly uncomplicated postpartum course.  Prior to discharge, pt was without major complaints.  Pain was well controlled.  Pt was ambulating, tolerating a regular diet, voiding without difficulty, and bonding well with baby.  Lochia was light.  Vital signs where physiologic and stable.  Physical exam showed no acute findings.  Pt had satisfied routine postpartum discharge criteria and expressed the desire to be discharge from the hospital.     Pertinent Labs or Studies:      Recent Results (from the past 336 hour(s))   CBC auto differential    Collection Time: 23  6:19 AM   Result Value Ref Range    WBC 16.15 (H) 3.90 - 12.70 K/uL    Hemoglobin 9.4 (L) 12.0 - 16.0 g/dL    Hematocrit 29.5 (L) 37.0 - 48.5 %    Platelets 313 150 - 450 K/uL   CBC auto differential    Collection Time: 05/10/23  2:58 PM   Result  "Value Ref Range    WBC 25.87 (H) 3.90 - 12.70 K/uL    Hemoglobin 11.6 (L) 12.0 - 16.0 g/dL    Hematocrit 36.3 (L) 37.0 - 48.5 %    Platelets 404 150 - 450 K/uL     ABO:  O POS    Discharge Exam:      Temp:  [97.9 °F (36.6 °C)-98.5 °F (36.9 °C)] 97.9 °F (36.6 °C)  Pulse:  [79-86] 82  Resp:  [16-20] 18  SpO2:  [98 %-100 %] 98 %  BP: (128-149)/(78-89) 147/87    BP (!) 147/87 (BP Location: Left arm, Patient Position: Lying)   Pulse 82   Temp 97.9 °F (36.6 °C) (Oral)   Resp 18   Ht 5' 4" (1.626 m)   Wt 65.8 kg (145 lb)   LMP  (LMP Unknown)   SpO2 98%   Breastfeeding No   BMI 24.89 kg/m²     GENERAL:  No acute distress. Alert and oriented x 3.   HEENT:  Normocephalic. No scleral icterus. Neck supple. Moist mucus membranes.   LUNGS:  Clear to auscultation bilaterally.   HEART:  Regular rate and rhythm with physiologic heart sounds.   ABDOMEN:  Soft, non-tender, non-distended, no guarding, rigidity, or rebound.   FUNDUS:  Firm, nontender below the umbilicus.   EXTREMITIES:  No cramping, claudication.  Trace edema. Good skin turgor. +2 distal pulses, symmetric. Full range of motion.   NEUROLOGIC:  Grossly intact bilaterally.   PSYCH:  Mood and affect appropriate.   PERINEUM:  Intact with light lochia.    Discharge Condition:  Stable      Discharge Disposition:  Home       Discharge Medications:     Motrin  Labetalol 100 mg bid    Discharge Activites:      Resume activities as tolerated with adequate rest  Pelvic rest for 6 weeks   No heavy lifting greater 10 lbs or strenuous activities   Showers only  Wound care instructions and precautions given     Discharge Diet:  Regular diet    Discharge Instructions:      Pt advised to contact the clinic or emergency department for any concerns including but not limited to an increase in her lochia, abdominal pain, foul vaginal discharge, weakness, decrease activity level, decrease appetite, feeling sad or hopeless, inability to care for her baby, breast pain or infection, " difficulty with voiding or having bowel movements, perineal pain or breakdown, wound breakdown, fever >100.4 or abnormal vital signs, shortness of breath, chest pain, dizziness or feeling faint, pain or swelling in her extremities, headaches not relieved with rest and Tylenol, vision changes, seizure activities, abnormal bleeding/bruising, or any other health concerns.  Continue labetalol 100 mg bid for gestational hypertension. Check bp tid at home and call office if bp > 150/100 or bp <100/70, P < 50. Hypertensive/preE precautions reivewed.  Post-partum care instructions/precautions and hospital course reviewed with the patient prior to discharge.  All of pt questions were answered, pt voiced understanding.       Follow-up Visit:  1 week for postpartum visit/BP check, or sooner if any problems.       Dontae Gant MD

## 2023-05-12 NOTE — PLAN OF CARE
VS stable with no signs of distress. Discharge instructions reviewed with patient. Reviewed urgent maternal warning signs and instructed to go to ER or call physician if symptoms occur. Reviewed signs and symptoms of depression and anxiety and provided relevant handouts. Reviewed community resources available. Instructed on follow-up appointment with physician. Patient voiced understanding of all.

## 2023-05-12 NOTE — DISCHARGE INSTRUCTIONS
After a Vaginal Birth    General Discharge Instructions    May follow a regular diet, unless otherwise discussed with physician.  Take showers, not baths unless otherwise discussed with physician.  Activity as tolerated.  No lifting or heavy exercise for 6 weeks, no driving for 2 weeks, no sexual intercourse, douching or tampons for 6 weeks  Rhogam injection given  _____  Rubella vaccine given _____  (avoid pregnancy for 3 mos. after injection)  May return to work/school as discussed with physician  Take medications as directed  Discuss birth control with physician  Breast care support bra worn at all times  Lactation consultant referral number ( [phone] or [phone])    Call Your Healthcare Provider Right Away If You Have:  A temperature of 100.4°F or higher.  If your blood pressure is over 155/105.  You have difficulty catching your breath or trouble breathing.  Heavy vaginal bleeding, clots, or vaginal discharge with foul odor. (heavier than menses)  Persistent nausea or vomiting.  You gain more than 3 pounds in 3 days.  Severe headaches not relieved by Tylenol (acetaminophen) or Motrin (ibuprofen)  Blurry or double vision, see spots or flashing lights.  Dizziness or fainting.  New onset swelling or worsening of existing swelling.  Burning or pain when you urinate.  No bowel movement for 5 days.  Redness, warmth, swelling, or pain in the lower leg.  Redness, discharge, or pain worse than you had in the hospital.  Burning, pain, red streaks, or lumpy areas in your breasts.  Cracks, blisters, or blood on your nipples.  Feelings of extreme sadness or anxiety, or a feeling that you dont want to be with your baby.  If you have any new or unusual symptoms or have questions or concerns    Some of these symptoms can occur up to 4 to 6 weeks after delivery. This can be a sign of pre-eclampsia, which is a serious disease that can cause stroke, seizures, organ damage, or death. Do not wait to call your doctor or  seek medical attention.    If You Had Stitches  You may have received stitches in the skin near your vagina. The stitches might have closed an episiotomy (an incision that enlarges the opening of the vagina). Or you may have needed stitches to repair torn skin. Either way, your stitches should dissolve within weeks. Until then, you can help reduce discomfort, aid healing, and reduce your risk of infection by keeping the stitches clean. These tips can help:    Gently wipe from front to back after you urinate or have a bowel movement.  After wiping, spray warm water on the area. Or you can have a sitz bath. This means sitting in a tub with a few inches of water in it. Then pat the area dry or use a hairdryer on a cool setting.  You can take a shower instead of a bath.  Change sanitary pads at least every 2-4 hours.  Place cold or heat packs on the area as directed by your doctors or nurses. Keep a thin towel between the pack and your skin.  Sit on firm seats so the stitches pull less.     Follow-Up  Schedule a  follow-up exam with your healthcare provider for about 6 weeks after delivery. During this exam, your uterus and vaginal area will be checked. Contact your healthcare provider if you think you or your baby are having any problems.

## 2023-05-15 ENCOUNTER — TELEPHONE (OUTPATIENT)
Dept: OBSTETRICS AND GYNECOLOGY | Facility: CLINIC | Age: 28
End: 2023-05-15
Payer: MEDICAID

## 2023-05-15 NOTE — TELEPHONE ENCOUNTER
Left message to give pt appt date and time for BP check.       ----- Message from Yahaira Minor sent at 5/13/2023 10:07 AM CDT -----  Type:  Needs Medical Advice    Who Called: pt  Symptoms (please be specific): pt is requesting to be seen by this provider for a  Post partum visit and to be seen for high blood pressure    Would the patient rather a call back or a response via [facility]? call  Best Call Back Number: [phone]  Additional Information:

## 2023-05-16 LAB
FINAL PATHOLOGIC DIAGNOSIS: NORMAL
GROSS: NORMAL
Lab: NORMAL

## 2023-05-18 ENCOUNTER — POSTPARTUM VISIT (OUTPATIENT)
Dept: OBSTETRICS AND GYNECOLOGY | Facility: CLINIC | Age: 28
End: 2023-05-18
Payer: MEDICAID

## 2023-05-18 VITALS
DIASTOLIC BLOOD PRESSURE: 88 MMHG | WEIGHT: 172.75 LBS | SYSTOLIC BLOOD PRESSURE: 142 MMHG | BODY MASS INDEX: 29.65 KG/M2

## 2023-05-18 PROCEDURE — 59430 PR CARE AFTER DELIVERY ONLY: ICD-10-PCS | Mod: ,,, | Performed by: OBSTETRICS & GYNECOLOGY

## 2023-05-18 PROCEDURE — 99999 PR PBB SHADOW E&M-EST. PATIENT-LVL III: CPT | Mod: PBBFAC,,, | Performed by: OBSTETRICS & GYNECOLOGY

## 2023-05-18 PROCEDURE — 99999 PR PBB SHADOW E&M-EST. PATIENT-LVL III: ICD-10-PCS | Mod: PBBFAC,,, | Performed by: OBSTETRICS & GYNECOLOGY

## 2023-05-18 PROCEDURE — 99213 OFFICE O/P EST LOW 20 MIN: CPT | Mod: PBBFAC | Performed by: OBSTETRICS & GYNECOLOGY

## 2023-05-19 NOTE — PROGRESS NOTES
[facility]  Ambulatory Clinic  Obstetrics & Gynecology    Date of Visit:  2023    Chief Complaint:  Post-partum visit    Subjective:      Deborah Akbar is a 28 y.o.  who is s/p spontaneous vaginal delivery here for post-partum visit/BP check.  Pt had gestational hypertension and was discharge home with labetalol 100 mg bid.  Pt had no prenatal care and delivered at home prior to arrival to hospital.  Denies headaches, vision changes, epigastric pain, or acute swelling.  Pt has no major complaints today.  Pt had an uneventful post-partum hospital course and has been doing well since delivery.  Pt reports baby is doing well and she is breast/bottle feeding without difficulty.  Her lochia resolved.  Pt denies abdominal/pelvic pain, fevers, abnormal vaginal discharge, symptoms of post-partum blues or depression, breast complaints, GI or urinary compliants.  Pt is undecided on birth control at this time.    Review of Systems:      CONSTITUTIONAL:  No fever, chills, weakness, fatigue, decreased activity  HEENT: No headaches, vision changes  LUNGS:  No shortness of breath  HEART:  No palpitations, chest pain, abnormal swelling  BREAST:  No lump, pain, redness, skin changes  GI:  No nausea, vomiting, diarrhea, constipation, abdomen pain, blood in stool  URINARY:  No dysuria, hematuria, frequency  SKIN:  No rash, bruising  NEURO:  No headache, weakness  PSYCH:  No anxiety, depression, suicidal or homicidal ideations    Objective:     BP (!) 142/88   Wt 78.4 kg (172 lb 11.7 oz)   LMP  (LMP Unknown)   Breastfeeding Yes   BMI 29.65 kg/m²   Repeat 126/74    GENERAL:  NAD, A&Ox3, well nourished.  HEENT:  NCAT,moist mucus membranes, neck supple.  BREAST:  Pt deferred today.    LUNGS:  Clear normal respiratory effort.  HEART:  RRR with physiologic heart sounds.  ABDOMEN:  Soft, non-tender, non-distended without any guarding, rigidity or rebound. Normoactive bowel sounds.  Fundus firm,  non-tender, below umbilicus.  EXT:  Symmetric. No cramping, claudication, or edema. +2 distal pulses. FROM.  SKIN:  No rashes, good turgor & capillary refill.  NEURO:  Grossly intact bilaterally. +2 DTR.  PSYCH:  Mood & affect appropriate.     PELVIC:  Pt deferred today.      Chaperone present for exam.    Assessment:     28 y.o.  s/p  at term - doing well post-partum  Gestational hypertension  Hep C antibody positive     Plan:    Post-partum care instructions and precautions reviewed.  Pelvic rest x 6 wks post-partum.  Continue prenatal vitamins,.      Continue labetalol 100 mg bid.  Check bp tid at home and call office if bp > 150/100 or bp <100/70, P < 50.  Hypertensive/preE precautions reivewed.    Pt advised to f/u PCP for long term follow up for Hep C.  Long term health sequela of Hep C discussed.      Encourage healthy lifestyle modifications.    Return 4 wks for post-partum visit or sooner as needed.  Pt voiced understanding.       Dontae Gant MD

## 2023-06-22 ENCOUNTER — POSTPARTUM VISIT (OUTPATIENT)
Dept: OBSTETRICS AND GYNECOLOGY | Facility: CLINIC | Age: 28
End: 2023-06-22
Payer: MEDICAID

## 2023-06-22 VITALS
SYSTOLIC BLOOD PRESSURE: 156 MMHG | BODY MASS INDEX: 31.67 KG/M2 | WEIGHT: 185.5 LBS | HEIGHT: 64 IN | DIASTOLIC BLOOD PRESSURE: 98 MMHG

## 2023-06-22 DIAGNOSIS — Z91.89 AT RISK FOR DEPRESSED MOOD DURING POSTPARTUM PERIOD: ICD-10-CM

## 2023-06-22 DIAGNOSIS — I10 PRIMARY HYPERTENSION: ICD-10-CM

## 2023-06-22 PROCEDURE — 99999 PR PBB SHADOW E&M-EST. PATIENT-LVL III: ICD-10-PCS | Mod: PBBFAC,,, | Performed by: OBSTETRICS & GYNECOLOGY

## 2023-06-22 PROCEDURE — 99999 PR PBB SHADOW E&M-EST. PATIENT-LVL III: CPT | Mod: PBBFAC,,, | Performed by: OBSTETRICS & GYNECOLOGY

## 2023-06-22 PROCEDURE — 0503F POSTPARTUM CARE VISIT: CPT | Mod: ,,, | Performed by: OBSTETRICS & GYNECOLOGY

## 2023-06-22 PROCEDURE — 99213 OFFICE O/P EST LOW 20 MIN: CPT | Mod: PBBFAC | Performed by: OBSTETRICS & GYNECOLOGY

## 2023-06-22 PROCEDURE — 0503F PR POSTPARTUM CARE VISIT: ICD-10-PCS | Mod: ,,, | Performed by: OBSTETRICS & GYNECOLOGY

## 2023-06-22 RX ORDER — AMLODIPINE BESYLATE 5 MG/1
5 TABLET ORAL DAILY
Qty: 90 TABLET | Refills: 0 | Status: SHIPPED | OUTPATIENT
Start: 2023-06-22

## 2023-06-22 NOTE — PROGRESS NOTES
"[facility]  Ambulatory Clinic  Obstetrics & Gynecology    Date of Visit:  2023    Chief Complaint:  Post-partum visit    Subjective:      Deborah Akbar is a 28 y.o.  who is s/p spontaneous vaginal delivery here for post-partum visit.  Pt has no major complaints today.  Pt had an uneventful post-partum hospital course and has been doing well since delivery.  Pt reports baby is doing well and she is bottle feeding without difficulty.  Her lochia resolved.  Pt denies abdominal/pelvic pain, fevers, abnormal vaginal discharge, symptoms of post-partum blues or depression, breast, GI or urinary complaints.  Pt is requesting OCP and states tolerated it well in the past.    Review of Systems:      CONSTITUTIONAL:  No fever, chills, weakness, fatigue, decreased activity  HEENT: No headaches, vision changes  LUNGS:  No shortness of breath  HEART:  No palpitations, chest pain, abnormal swelling  BREAST:  No lump, pain, redness, skin changes  GI:  No nausea, vomiting, diarrhea, constipation, abdomen pain, blood in stool  URINARY:  No dysuria, hematuria, frequency  SKIN:  No rash, bruising  NEURO:  No headache, weakness  PSYCH:  No anxiety, depression, suicidal or homicidal ideations    Objective:     BP (!) 156/98   Ht 5' 4" (1.626 m)   Wt 84.1 kg (185 lb 8.3 oz)   LMP 2023   Breastfeeding No   BMI 31.84 kg/m²   Repeat 142/80, Pulse 70's, Resp rate 14     GENERAL:  NAD, A&Ox3, well nourished.  HEENT:  NCAT,moist mucus membranes, neck supple.  BREAST:  Symmetric, non-tender, no obvious masses, no skin changes, no nipple discharge.  LUNGS:  CTA-B.  HEART:  RRR with physiologic heart sounds.  ABDOMEN:  Soft, non-tender, non-distended without any guarding, rigidity or rebound. Normoactive bowel sounds.  Fundus firm, non-tender, below umbilicus.  EXT:  Symmetric. No cramping, claudication, or edema. +2 distal pulses. FROM.  SKIN:  No rashes, good turgor & capillary refill.  NEURO:  " Grossly intact bilaterally. +2 DTR.  PSYCH:  Mood & affect appropriate.       PELVIC:  Normal female external genitalia without any obvious lesions.  Perinuem intact.  Adequate perineal body.  Normal urethral meatus.  No gross lymphadenopathy.   Vagina:  Intact with good support, lochia resolved.     Cervix:  No cervical motion tenderness, discharge, or obvious lesions.    Uterus:  Small, non-tender, normal contour.    Adnexa:  No masses, non-tender.    Rectal:  Patient declined.  No obvious external lesions.     Chaperone present for exam.    Assessment:     28 y.o.  s/p  at term - doing well post-partum  Gestational hypertension, suspect chronic hypertension  Hep C antibody positive    Plan:    Post-partum care instructions and precautions reviewed.    Pelvic rest x 6 wks post-partum.    Continue prenatal vitamins    Start norvacs 5 mg qd.  Stop labetalol.  Check bp tid at home and call office if bp > 150/100 or bp <100/70, P < 50.  Hypertensive/preE precautions reivewed.  Pt advised to see her PCP for longterm follow up of her HTN, pt states she has apt with her PCP provider 23.  Pt advised to f/u PCP for long term follow up for Hep C.  Long term health sequela of Hep C discussed.    Refer to psych due to elevated PPD score.  Mental hygiene advice.  Parameters to seek urgent care discussed.  F/u with PCP for health maintenance.    Encourage healthy lifestyle modifications.  Return 3 months for well woman GYN exam, or sooner as needed.  Pt voiced understanding.       Dontae Gant MD

## 2023-07-13 ENCOUNTER — PATIENT MESSAGE (OUTPATIENT)
Dept: PSYCHIATRY | Facility: CLINIC | Age: 28
End: 2023-07-13
Payer: MEDICAID